# Patient Record
Sex: MALE | Race: AMERICAN INDIAN OR ALASKA NATIVE
[De-identification: names, ages, dates, MRNs, and addresses within clinical notes are randomized per-mention and may not be internally consistent; named-entity substitution may affect disease eponyms.]

---

## 2017-12-03 ENCOUNTER — HOSPITAL ENCOUNTER (EMERGENCY)
Dept: HOSPITAL 5 - ED | Age: 42
Discharge: HOME | End: 2017-12-03
Payer: COMMERCIAL

## 2017-12-03 VITALS — SYSTOLIC BLOOD PRESSURE: 125 MMHG | DIASTOLIC BLOOD PRESSURE: 77 MMHG
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
## 2017-12-03 DIAGNOSIS — M54.6: ICD-10-CM

## 2017-12-03 DIAGNOSIS — F17.200: ICD-10-CM
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
## 2017-12-03 DIAGNOSIS — S13.4XXA: Primary | ICD-10-CM
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
## 2017-12-03 PROCEDURE — 99282 EMERGENCY DEPT VISIT SF MDM: CPT
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
## 2018-02-10 ENCOUNTER — HOSPITAL ENCOUNTER (EMERGENCY)
Dept: HOSPITAL 5 - ED | Age: 43
Discharge: HOME | End: 2018-02-10
Payer: COMMERCIAL

## 2018-02-10 VITALS — SYSTOLIC BLOOD PRESSURE: 125 MMHG | DIASTOLIC BLOOD PRESSURE: 61 MMHG

## 2018-02-10 DIAGNOSIS — F17.200: ICD-10-CM
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
## 2018-02-10 DIAGNOSIS — S63.601A: Primary | ICD-10-CM
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
## 2018-02-10 NOTE — XRAY REPORT
FINAL REPORT



EXAM: XR RT HAND 



CLINICAL INDICATIONS: RT THUMB PAIN



FINDINGS: 



PA and lateral views of the right hand were acquired and

demonstrate no fracture the right hand or right thumb.  No

foreign body is identified.



IMPRESSION:



THE RIGHT HAND AND THUMB APPEAR INTACT

## 2018-02-10 NOTE — XRAY REPORT
FINAL REPORT



EXAM: XR RT FINGERS 



CLINICAL INDICATIONS: RT THUMB PAIN



FINDINGS: 



PA view of the right hand was acquired as well as lateral and

oblique views of the right thumb.  No fracture is seen in the

right thumb.  No foreign body is identified.



IMPRESSION:



NO FRACTURE IS SEEN IN THE RIGHT THUMB